# Patient Record
Sex: FEMALE | Race: WHITE | NOT HISPANIC OR LATINO | Employment: OTHER | URBAN - METROPOLITAN AREA
[De-identification: names, ages, dates, MRNs, and addresses within clinical notes are randomized per-mention and may not be internally consistent; named-entity substitution may affect disease eponyms.]

---

## 2021-04-23 ENCOUNTER — IMMUNIZATION (OUTPATIENT)
Dept: INTERNAL MEDICINE | Facility: CLINIC | Age: 26
End: 2021-04-23
Payer: OTHER GOVERNMENT

## 2021-04-23 DIAGNOSIS — Z23 NEED FOR VACCINATION: Primary | ICD-10-CM

## 2021-04-23 PROCEDURE — 91300 COVID-19, MRNA, LNP-S, PF, 30 MCG/0.3 ML DOSE VACCINE: CPT | Mod: PBBFAC

## 2021-08-23 ENCOUNTER — IMMUNIZATION (OUTPATIENT)
Dept: PRIMARY CARE CLINIC | Facility: CLINIC | Age: 26
End: 2021-08-23
Payer: OTHER GOVERNMENT

## 2021-08-23 DIAGNOSIS — Z23 NEED FOR VACCINATION: Primary | ICD-10-CM

## 2021-08-23 PROCEDURE — 91300 COVID-19, MRNA, LNP-S, PF, 30 MCG/0.3 ML DOSE VACCINE: ICD-10-PCS | Mod: ,,, | Performed by: FAMILY MEDICINE

## 2021-08-23 PROCEDURE — 91300 COVID-19, MRNA, LNP-S, PF, 30 MCG/0.3 ML DOSE VACCINE: CPT | Mod: ,,, | Performed by: FAMILY MEDICINE

## 2021-08-23 PROCEDURE — 0002A COVID-19, MRNA, LNP-S, PF, 30 MCG/0.3 ML DOSE VACCINE: CPT | Mod: CV19,,, | Performed by: FAMILY MEDICINE

## 2021-08-23 PROCEDURE — 0002A COVID-19, MRNA, LNP-S, PF, 30 MCG/0.3 ML DOSE VACCINE: ICD-10-PCS | Mod: CV19,,, | Performed by: FAMILY MEDICINE

## 2024-09-02 ENCOUNTER — HOSPITAL ENCOUNTER (EMERGENCY)
Facility: HOSPITAL | Age: 29
Discharge: HOME | End: 2024-09-02
Attending: EMERGENCY MEDICINE
Payer: COMMERCIAL

## 2024-09-02 VITALS
WEIGHT: 172 LBS | SYSTOLIC BLOOD PRESSURE: 95 MMHG | RESPIRATION RATE: 20 BRPM | TEMPERATURE: 98 F | BODY MASS INDEX: 27.64 KG/M2 | HEIGHT: 66 IN | HEART RATE: 84 BPM | OXYGEN SATURATION: 99 % | DIASTOLIC BLOOD PRESSURE: 56 MMHG

## 2024-09-02 DIAGNOSIS — R10.9 RIGHT FLANK PAIN: Primary | ICD-10-CM

## 2024-09-02 DIAGNOSIS — R10.11 RUQ PAIN: ICD-10-CM

## 2024-09-02 LAB
BACTERIA #/AREA URNS HPF: NEGATIVE /HPF
BILIRUB UR QL STRIP: NEGATIVE
CLARITY UR: CLEAR
COLOR UR: YELLOW
GLUCOSE UR QL STRIP: NEGATIVE
HGB UR QL STRIP: NEGATIVE
HYALINE CASTS #/AREA URNS LPF: 0.3 /LPF
KETONES UR QL STRIP: NEGATIVE
LEUKOCYTE ESTERASE UR QL STRIP: ABNORMAL
MICROSCOPIC COMMENT: ABNORMAL
NITRITE UR QL STRIP: NEGATIVE
PH UR STRIP: >8 [PH] (ref 5–8)
PROT UR QL STRIP: NEGATIVE
RBC #/AREA URNS HPF: 0 /HPF (ref 0–4)
SP GR UR STRIP: 1.02 (ref 1–1.03)
SQUAMOUS #/AREA URNS HPF: 3 /HPF
URN SPEC COLLECT METH UR: ABNORMAL
UROBILINOGEN UR STRIP-ACNC: NEGATIVE EU/DL
WBC #/AREA URNS HPF: 2 /HPF (ref 0–5)

## 2024-09-02 PROCEDURE — 81000 URINALYSIS NONAUTO W/SCOPE: CPT | Performed by: EMERGENCY MEDICINE

## 2024-09-02 PROCEDURE — 99284 EMERGENCY DEPT VISIT MOD MDM: CPT | Mod: 25

## 2024-09-02 RX ORDER — ACETAMINOPHEN 500 MG
1000 TABLET ORAL EVERY 8 HOURS PRN
Qty: 30 TABLET | Refills: 0 | Status: SHIPPED | OUTPATIENT
Start: 2024-09-02

## 2024-09-02 NOTE — ED PROVIDER NOTES
EMERGENCY DEPARTMENT HISTORY AND PHYSICAL EXAM     This note is dictated on M*Modal word recognition program.  There are word recognition mistakes and grammatical errors that are occasionally missed on review.     Date: 2024   Patient Name: Maru Barton       History of Presenting Illness      Chief Complaint   Patient presents with    Flank Pain     Pt stated that she began experiencing sharp right flank pain radiating to RLQ abdomen - recently treated with Macrobid for UTI beginning of last month. Last BM earlier today. Currently 16w3d gestation.            Maru Barton is a 29 y.o. female with PMHX of spontaneous miscarriage at 8 weeks who presents to the emergency department C/O abdominal pain.    Patient  at 16 weeks 3 days gestation presents with right flank pain.  Patient reports flank pain that began about a day ago.  Describes sharp pain along right upper abdomen that radiates to right flank.  Worse with body motion.  Reports pelvic pressure.  No abnormal vaginal discharge or bleeding.  No urinary symptoms.  No nausea or vomiting.  Patient reports she was treated for UTI earlier in this pregnancy with Macrobid.  Tolerating p.o..  Normal appetite.  Normal bowel movements.    PCP: No, Primary Doctor        No current facility-administered medications for this encounter.     Current Outpatient Medications   Medication Sig Dispense Refill    acetaminophen (TYLENOL) 500 MG tablet Take 2 tablets (1,000 mg total) by mouth every 8 (eight) hours as needed for Pain or Temperature greater than (101). 30 tablet 0    nystatin-triamcinolone (MYCOLOG II) cream Apply topically 3 (three) times daily. for 14 days 30 g 0    sertraline (ZOLOFT) 100 MG tablet Take 150 mg by mouth.      VITAMIN D3 50 mcg (2,000 unit) Cap Take 1 capsule by mouth once daily.             Past History     Past Medical History:   Past Medical History:   Diagnosis Date    Bipolar 1 disorder     Chlamydia         Past Surgical  "History:   Past Surgical History:   Procedure Laterality Date     SECTION      DILATION AND CURETTAGE OF UTERUS          Family History:   Family History   Problem Relation Name Age of Onset    Hypertension Paternal Grandfather      Diabetes Paternal Grandmother      Breast cancer Maternal Grandmother      Mental illness Father      Mental illness Mother          Social History:   Social History     Tobacco Use    Smoking status: Former     Types: Cigarettes, Vaping with nicotine    Smokeless tobacco: Never   Substance Use Topics    Alcohol use: Not Currently    Drug use: Never        Allergies:   Review of patient's allergies indicates:  No Known Allergies       Review of Systems   Review of Systems   See HPI for pertinent positives and negatives       Physical Exam     Vitals:    24 1438   BP: 120/81   Pulse: 92   Resp: 18   Temp: 97.7 °F (36.5 °C)   SpO2: 100%   Weight: 78 kg (172 lb)   Height: 5' 6" (1.676 m)      Physical Exam  Vitals and nursing note reviewed. Exam conducted with a chaperone present.   Constitutional:       General: She is not in acute distress.     Appearance: Normal appearance. She is not ill-appearing.   HENT:      Head: Normocephalic and atraumatic.      Right Ear: External ear normal.      Left Ear: External ear normal.      Nose: Nose normal. No congestion or rhinorrhea.      Mouth/Throat:      Mouth: Mucous membranes are moist.   Eyes:      Conjunctiva/sclera: Conjunctivae normal.      Pupils: Pupils are equal, round, and reactive to light.   Cardiovascular:      Rate and Rhythm: Normal rate and regular rhythm.      Heart sounds: Normal heart sounds.   Pulmonary:      Effort: Pulmonary effort is normal. No respiratory distress.   Abdominal:      Palpations: Abdomen is soft.      Tenderness: There is abdominal tenderness in the right upper quadrant. There is no guarding or rebound. Negative signs include Ray's sign.   Musculoskeletal:         General: No deformity. " Normal range of motion.      Cervical back: Normal range of motion. No rigidity.   Skin:     General: Skin is dry.   Neurological:      General: No focal deficit present.      Mental Status: She is alert and oriented to person, place, and time. Mental status is at baseline.   Psychiatric:         Mood and Affect: Mood is anxious.         Behavior: Behavior normal.              Diagnostic Study Results      Labs -   Recent Results (from the past 12 hour(s))   Urinalysis, Reflex to Urine Culture Urine, Clean Catch    Collection Time: 09/02/24  2:41 PM    Specimen: Urine   Result Value Ref Range    Specimen UA Urine, Clean Catch     Color, UA Yellow Yellow, Straw, Latasha    Appearance, UA Clear Clear    pH, UA >8.0 (A) 5.0 - 8.0    Specific Gravity, UA 1.020 1.005 - 1.030    Protein, UA Negative Negative    Glucose, UA Negative Negative    Ketones, UA Negative Negative    Bilirubin (UA) Negative Negative    Occult Blood UA Negative Negative    Nitrite, UA Negative Negative    Urobilinogen, UA Negative <2.0 EU/dL    Leukocytes, UA 2+ (A) Negative   Urinalysis Microscopic    Collection Time: 09/02/24  2:41 PM   Result Value Ref Range    RBC, UA 0 0 - 4 /hpf    WBC, UA 2 0 - 5 /hpf    Bacteria Negative None-Occ /hpf    Squam Epithel, UA 3 /hpf    Hyaline Casts, UA 0.3 (A) 0-1/lpf /lpf    Microscopic Comment SEE COMMENT         Radiologic Studies -    US OB 14+ Wks, TransAbd, Single Gestation   Final Result      Single live intrauterine gestation with a fetal heart rate of 152 beats per minute.      Unremarkable placenta      Nonvisualization of left ovary      No pelvic masses or fluid collections detected.         Electronically signed by: Maggy Lackey MD   Date:    09/02/2024   Time:    16:26      US Abdomen Limited   Final Result      Unremarkable gallbladder         Electronically signed by: Maggy Lackey MD   Date:    09/02/2024   Time:    16:22           Medications given in the ED-   Medications - No data to  display        Medical Decision Making    I am the first provider for this patient.     I reviewed the vital signs, available nursing notes, past medical history, past surgical history, family history and social history.     Vital Signs:  Reviewed the patient's vital signs.     Pulse Oximetry Analysis and Interpretation:    100% on Room Air, normal      External Test Results (Pertinent to encounter):    Records Reviewed: Nursing Notes and Old Medical Records    History Obtained By: Patient    Provider Notes: Maru Barton is a 29 y.o. female with right-sided pain    Co-morbidities Considered:  Pregnancy    Differential Diagnosis:  Biliary colic, renal colic, colitis, round ligament pain, appendicitis      ED Course:    This is a well-appearing otherwise healthy 21-year-old female presents with right-sided pain.  No nausea or vomiting.  No  complaints.  Normal bowel movements.  Exam is not peritonitic and patient does not appear ill.  Urinalysis unremarkable.  Ultrasound of right upper quadrant as well as pregnancy were unremarkable.  No indication pyelonephritis, cholelithiasis, or other acute intra-abdominal pathology.  Evaluation is not suspicious for acute appendicitis however symptoms worsen or change in nature she should return to the ER for re-evaluation.  Likely round ligament pain.  Discussed management.  Advised close follow-up with her OBGYN.  Reasons to return to ED discussed.  Patient comfortable and agreeable with plan.         Problems Addressed:  Abdominal pain    Procedures:   Procedures       Diagnosis and Disposition     Critical Care:      DISCHARGE NOTE:       Maru Barton's  results have been reviewed with her.  She has been counseled regarding her diagnosis, treatment, and plan.  She verbally conveys understanding and agreement of the signs, symptoms, diagnosis, treatment and prognosis and additionally agrees to follow up as discussed.  She also agrees with the care-plan and  conveys that all of her questions have been answered.  I have also provided discharge instructions for her that include: educational information regarding their diagnosis and treatment, and list of reasons why they would want to return to the ED prior to their follow-up appointment, should her condition change. She has been provided with education for proper emergency department utilization.         CLINICAL IMPRESSION:         1. Right flank pain    2. RUQ pain              PLAN:   1. Discharge Home  2.      Medication List        START taking these medications      acetaminophen 500 MG tablet  Commonly known as: TYLENOL  Take 2 tablets (1,000 mg total) by mouth every 8 (eight) hours as needed for Pain or Temperature greater than (101).            ASK your doctor about these medications      nystatin-triamcinolone cream  Commonly known as: MYCOLOG II  Apply topically 3 (three) times daily. for 14 days     sertraline 100 MG tablet  Commonly known as: ZOLOFT     VITAMIN D3 50 mcg (2,000 unit) Cap capsule  Generic drug: cholecalciferol (vitamin D3)               Where to Get Your Medications        These medications were sent to [facility] #[id number] - [address] AT [address] [address]      Phone: [phone]   acetaminophen 500 MG tablet        [facility]. [place] - Emergency Department  [address] [facility] [address] [phone]  Go to   If symptoms worsen    Your OBGYN    Schedule an appointment as soon as possible for a visit   As needed       _______________________________     Please note that this dictation was completed with [employer], the computer voice recognition software.  Quite often unanticipated grammatical, syntax, homophones, and other interpretive errors are inadvertently transcribed by the computer software.  Please disregard these errors.  Please excuse any errors that have escaped final proofreading.              Kevin Pablo MD  09/02/24 [id number]